# Patient Record
Sex: MALE | Race: OTHER
[De-identification: names, ages, dates, MRNs, and addresses within clinical notes are randomized per-mention and may not be internally consistent; named-entity substitution may affect disease eponyms.]

---

## 2020-06-06 ENCOUNTER — HOSPITAL ENCOUNTER (EMERGENCY)
Dept: HOSPITAL 54 - ER | Age: 40
Discharge: HOME | End: 2020-06-06
Payer: MEDICAID

## 2020-06-06 VITALS — WEIGHT: 155 LBS | BODY MASS INDEX: 25.83 KG/M2 | HEIGHT: 65 IN

## 2020-06-06 VITALS — SYSTOLIC BLOOD PRESSURE: 114 MMHG | DIASTOLIC BLOOD PRESSURE: 60 MMHG
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
## 2020-06-06 DIAGNOSIS — S56.922A: Primary | ICD-10-CM
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
## 2020-06-06 PROCEDURE — A6403 STERILE GAUZE>16 <= 48 SQ IN: HCPCS

## 2020-06-06 PROCEDURE — 12006 RPR S/N/A/GEN/TRK20.1-30.0CM: CPT

## 2020-06-06 PROCEDURE — 73090 X-RAY EXAM OF FOREARM: CPT

## 2020-06-06 PROCEDURE — 90471 IMMUNIZATION ADMIN: CPT

## 2020-06-06 PROCEDURE — 90715 TDAP VACCINE 7 YRS/> IM: CPT

## 2020-06-06 PROCEDURE — 99283 EMERGENCY DEPT VISIT LOW MDM: CPT

## 2020-06-06 NOTE — NUR
"Cut upper Arm with chainsaw while at work", to ER bed 9, LFA laceration 5cm W 
x 11cm H. hooked to bp monitor and pox, changed to hosp gown, warm blanket 
provided, patient aao x 4, breathing even and unlabored, Dr Mullen at bedside 
for eval. ER tech at bedside for flushing of wound